# Patient Record
(demographics unavailable — no encounter records)

---

## 2025-01-21 NOTE — PLAN
[FreeTextEntry1] : [ ][place] questionnaires given to mother for parent and teacher- to be returned with current report card  [ ] Discussed use of Omega 3 fish oil [ ]Discussed use of medications as well as side effects if accommodations do not improve school performance [ ]Follow up 1 month to review [place] questionnaires

## 2025-01-21 NOTE — HISTORY OF PRESENT ILLNESS
[FreeTextEntry1] : YANDY is a 11 year old male here for initial evaluation of inattention.   According to [facility], the school gave recommendation for an evaluation for forgetfulness and lack of focus. This is the first year with this concern from the school. Academically, he is below grade level in math and approaching for [employer].  He can be fidgeting at the desk during class. Yandy states he has trouble focusing on all his classes. He states he plays with things around him. If he does not understand what is being taught or is board, he will just not pay attention. Yandy notes he daytimes often. He thinks about things he is going to do afterschool   Educational assessment:  Current Grade: 6th grade  Current District: [facility] ED/ Current Accommodations/ICT: General education   Home assessment: He does his homework independently. He can do his routine on his own in the morning. Yandy admits that he may need prompting in the morning. He fidgets through meals. Yandy is an only child. Yandy likes to play soccer during recess, play computer games. At home, he enjoys playing games on the phone and playing outside. No concern for anxiety/ depression. He is currently seeing a therapist every Tuesday for the past two months. Yandy states it is helping him so/so. His father went to [facility] last year and since then, Yandy has been down and at times depressed. Bedtime: 10 pm, falls asleep right away. He wakes up for school at 7:15 am. He sleeps through the night. He struggles with a multistep direction, he does best with single step directions. Parent states he does not lose things easily. [facility] notes that he struggles with focus, he may read a book and not retain anything he read when asked questions about it. He can focus to watch an entire movie.  Denies staring, eye fluttering, twitching, seizure or seizure-like activity. No serious head injury, meningoencephalitis.

## 2025-01-21 NOTE — REASON FOR VISIT
[Initial Consultation] : an initial consultation for [Mother] : mother [FreeTextEntry2] : inattention/ forgetfulness

## 2025-01-21 NOTE — ASSESSMENT
[FreeTextEntry1] :  YANDY is a 11 year old  male presenting for initial evaluation of inattention/hyperactivity   YANDY is in a general education 6th grade classroom setting with no services at this time. Teachers are concerned with inability to retain information learned and lack of focus. This is the first year parent was spoken to in regard to concerns. Academically, he is below grade level. Parent notes fidgeting and inattention as well. Yandy evaluated by  urgent care found to have depression and anxiety, being seen by a therapist. NO SI/HI. No concerns for staring episodes, twitching, rapid eye blinking or seizure-like activity. Non focal neurological exam. Will proceed with ADHD evaluation using [place] forms.

## 2025-01-21 NOTE — PHYSICAL EXAM
[Well-appearing] : well-appearing [Normocephalic] : normocephalic [No dysmorphic facial features] : no dysmorphic facial features [Neck supple] : neck supple [Straight] : straight [No deformities] : no deformities [Alert] : alert [Well related, good eye contact] : well related, good eye contact [Conversant] : conversant [Normal speech and language] : normal speech and language [Follows instructions well] : follows instructions well [VFF] : VFF [Pupils reactive to light and accommodation] : pupils reactive to light and accommodation [Full extraocular movements] : full extraocular movements [Normal facial sensation to light touch] : normal facial sensation to light touch [No facial asymmetry or weakness] : no facial asymmetry or weakness [Gross hearing intact] : gross hearing intact [Equal palate elevation] : equal palate elevation [Good shoulder shrug] : good shoulder shrug [Normal tongue movement] : normal tongue movement [Midline tongue, no fasciculations] : midline tongue, no fasciculations [Normal axial and appendicular muscle tone] : normal axial and appendicular muscle tone [Gets up on table without difficulty] : gets up on table without difficulty [No pronator drift] : no pronator drift [Normal finger tapping and fine finger movements] : normal finger tapping and fine finger movements [No abnormal involuntary movements] : no abnormal involuntary movements [5/5 strength in proximal and distal muscles of arms and legs] : 5/5 strength in proximal and distal muscles of arms and legs [Walks and runs well] : walks and runs well [Able to do deep knee bend] : able to do deep knee bend [Able to walk on heels] : able to walk on heels [Able to walk on toes] : able to walk on toes [Knee jerks] : knee jerks [Localizes LT and temperature] : localizes LT and temperature [No dysmetria on FTNT] : no dysmetria on FTNT [Good walking balance] : good walking balance [Normal gait] : normal gait [Able to tandem well] : able to tandem well [Negative Romberg] : negative Romberg [de-identified] : Breathing even and unlabored

## 2025-01-21 NOTE — CONSULT LETTER
[Dear  ___] : Dear  [unfilled], [Consult Letter:] : I had the pleasure of evaluating your patient, [unfilled]. [Consult Closing:] : Thank you very much for allowing me to participate in the care of this patient.  If you have any questions, please do not hesitate to contact me. [Sincerely,] : Sincerely, [FreeTextEntry3] : TERRI May-C Certified Family Nurse Practitioner Pediatric Neurology [facility] [address] [place] [address] Tel: [phone]. Fax: [phone]

## 2025-02-26 NOTE — PHYSICAL EXAM
[Well-appearing] : well-appearing [Normocephalic] : normocephalic [No dysmorphic facial features] : no dysmorphic facial features [Neck supple] : neck supple [Straight] : straight [No deformities] : no deformities [Alert] : alert [Well related, good eye contact] : well related, good eye contact [Conversant] : conversant [Normal speech and language] : normal speech and language [Follows instructions well] : follows instructions well [VFF] : VFF [Pupils reactive to light and accommodation] : pupils reactive to light and accommodation [Full extraocular movements] : full extraocular movements [Normal facial sensation to light touch] : normal facial sensation to light touch [No facial asymmetry or weakness] : no facial asymmetry or weakness [Gross hearing intact] : gross hearing intact [Equal palate elevation] : equal palate elevation [Good shoulder shrug] : good shoulder shrug [Normal tongue movement] : normal tongue movement [Midline tongue, no fasciculations] : midline tongue, no fasciculations [Normal axial and appendicular muscle tone] : normal axial and appendicular muscle tone [Gets up on table without difficulty] : gets up on table without difficulty [No pronator drift] : no pronator drift [Normal finger tapping and fine finger movements] : normal finger tapping and fine finger movements [No abnormal involuntary movements] : no abnormal involuntary movements [5/5 strength in proximal and distal muscles of arms and legs] : 5/5 strength in proximal and distal muscles of arms and legs [Walks and runs well] : walks and runs well [Able to do deep knee bend] : able to do deep knee bend [Able to walk on heels] : able to walk on heels [Able to walk on toes] : able to walk on toes [Knee jerks] : knee jerks [Localizes LT and temperature] : localizes LT and temperature [No dysmetria on FTNT] : no dysmetria on FTNT [Good walking balance] : good walking balance [Normal gait] : normal gait [Able to tandem well] : able to tandem well [Negative Romberg] : negative Romberg [de-identified] : Breathing even and unlabored

## 2025-02-26 NOTE — ASSESSMENT
[FreeTextEntry1] :  YANDY is a 11 year old  male presenting for f/u evaluation of inattention/hyperactivity   YANDY is in a general education 6th grade classroom setting with no services at this time. Teachers are concerned with inability to retain information learned and lack of focus. This is the first year parent was spoken to in regard to concerns. Academically, he is below grade level. Parent notes fidgeting and inattention as well. Yandy evaluated by  urgent care found to have depression and anxiety, being seen by a therapist. NO SI/HI. No concerns for staring episodes, twitching, rapid eye blinking or seizure-like activity. Non focal neurological exam. [place] forms reviewed, consistent with ADHD- Combined type.

## 2025-02-26 NOTE — CONSULT LETTER
[Dear  ___] : Dear  [unfilled], [Consult Letter:] : I had the pleasure of evaluating your patient, [unfilled]. [Consult Closing:] : Thank you very much for allowing me to participate in the care of this patient.  If you have any questions, please do not hesitate to contact me. [Sincerely,] : Sincerely, [FreeTextEntry3] : TERRI May-C Certified Family Nurse Practitioner Pediatric Neurology [facility] [address] [place] [address] Tel: [phone]. Fax: [phone]

## 2025-02-26 NOTE — PLAN
[FreeTextEntry1] : [ ] Accommodations letter provided to parent [ ] Continue use of Omega 3 fish oil [ ] Continue with therapy [ ]Follow up 8 months to 1 year

## 2025-02-26 NOTE — HISTORY OF PRESENT ILLNESS
[FreeTextEntry1] : YANDY is a 12 year old male here for f/u evaluation of inattention.   Interval hx 2/26/25: No Changes since the last visit according to parent. [place] forms reviewed at this time  [place] Forms Score Parent: ADD 7/9- (6/9) Hyperactivity 2/9- (6/9) ODD 4/8 - (4/8) Conduct Disorder 2/14 (3/14) Anxiety/depression- 6/7- (3/7)  Performance AVG: 3.5   Teacher:  ADD 7/9- (6/9) Hyperactivity 6/9- (6/9) ODD/ Conduct Disorder 1/10 - (4/10) Anxiety/depression- 0/7- (3/7)  Performance Avg 4.5       Reviewed hx: According to [facility], the school gave recommendation for an evaluation for forgetfulness and lack of focus. This is the first year with this concern from the school. Academically, he is below grade level in math and approaching for [employer].  He can be fidgeting at the desk during class. Yandy states he has trouble focusing on all his classes. He states he plays with things around him. If he does not understand what is being taught or is board, he will just not pay attention. Yandy notes he daytimes often. He thinks about things he is going to do afterschool   Educational assessment:  Current Grade: 6th grade  Current District: [facility] ED/ Current Accommodations/ICT: General education   Home assessment: He does his homework independently. He can do his routine on his own in the morning. Yandy admits that he may need prompting in the morning. He fidgets through meals. Yandy is an only child. Yandy likes to play soccer during recess, play computer games. At home, he enjoys playing games on the phone and playing outside. No concern for anxiety/ depression. He is currently seeing a therapist every Tuesday for the past two months. Yandy states it is helping him so/so. His father went to [facility] last year and since then, Yandy has been down and at times depressed. Bedtime: 10 pm, falls asleep right away. He wakes up for school at 7:15 am. He sleeps through the night. He struggles with a multistep direction, he does best with single step directions. Parent states he does not lose things easily. [facility] notes that he struggles with focus, he may read a book and not retain anything he read when asked questions about it. He can focus to watch an entire movie.  Denies staring, eye fluttering, twitching, seizure or seizure-like activity. No serious head injury, meningoencephalitis.

## 2025-02-26 NOTE — REASON FOR VISIT
[Mother] : mother [Follow-Up Evaluation] : a follow-up evaluation for [FreeTextEntry2] : inattention/ forgetfulness